# Patient Record
Sex: FEMALE | Race: WHITE | NOT HISPANIC OR LATINO
[De-identification: names, ages, dates, MRNs, and addresses within clinical notes are randomized per-mention and may not be internally consistent; named-entity substitution may affect disease eponyms.]

---

## 2021-03-19 PROBLEM — Z00.00 ENCOUNTER FOR PREVENTIVE HEALTH EXAMINATION: Status: ACTIVE | Noted: 2021-03-19

## 2021-03-22 ENCOUNTER — APPOINTMENT (OUTPATIENT)
Dept: OTOLARYNGOLOGY | Facility: CLINIC | Age: 21
End: 2021-03-22
Payer: COMMERCIAL

## 2021-03-22 VITALS
TEMPERATURE: 98.4 F | OXYGEN SATURATION: 98 % | DIASTOLIC BLOOD PRESSURE: 67 MMHG | BODY MASS INDEX: 26.66 KG/M2 | WEIGHT: 160 LBS | HEIGHT: 65 IN | HEART RATE: 66 BPM | SYSTOLIC BLOOD PRESSURE: 104 MMHG

## 2021-03-22 DIAGNOSIS — J32.2 CHRONIC ETHMOIDAL SINUSITIS: ICD-10-CM

## 2021-03-22 DIAGNOSIS — J34.89 OTHER SPECIFIED DISORDERS OF NOSE AND NASAL SINUSES: ICD-10-CM

## 2021-03-22 DIAGNOSIS — R06.89 OTHER ABNORMALITIES OF BREATHING: ICD-10-CM

## 2021-03-22 DIAGNOSIS — Z98.890 OTHER SPECIFIED POSTPROCEDURAL STATES: ICD-10-CM

## 2021-03-22 DIAGNOSIS — Z86.39 PERSONAL HISTORY OF OTHER ENDOCRINE, NUTRITIONAL AND METABOLIC DISEASE: ICD-10-CM

## 2021-03-22 DIAGNOSIS — Z78.9 OTHER SPECIFIED HEALTH STATUS: ICD-10-CM

## 2021-03-22 PROCEDURE — 99072 ADDL SUPL MATRL&STAF TM PHE: CPT

## 2021-03-22 PROCEDURE — 31231 NASAL ENDOSCOPY DX: CPT

## 2021-03-22 PROCEDURE — 99203 OFFICE O/P NEW LOW 30 MIN: CPT | Mod: 25

## 2021-03-22 PROCEDURE — 99204 OFFICE O/P NEW MOD 45 MIN: CPT | Mod: 25

## 2021-03-22 RX ORDER — METHIMAZOLE 5 MG/1
TABLET ORAL
Refills: 0 | Status: ACTIVE | COMMUNITY

## 2021-03-22 RX ORDER — MUPIROCIN 20 MG/G
2 OINTMENT TOPICAL TWICE DAILY
Qty: 2 | Refills: 3 | Status: ACTIVE | COMMUNITY
Start: 2021-03-22 | End: 1900-01-01

## 2021-03-22 RX ORDER — SULFAMETHOXAZOLE AND TRIMETHOPRIM 800; 160 MG/1; MG/1
800-160 TABLET ORAL TWICE DAILY
Qty: 20 | Refills: 0 | Status: ACTIVE | COMMUNITY
Start: 2021-03-22 | End: 1900-01-01

## 2021-03-23 NOTE — ASSESSMENT
[FreeTextEntry1] : 20F with nasal obstruction after rhinoplasty in June 2020. On exam, patient has significant nasal crusting and erythema. \par \par Plan:\par - Rx Bactrim- discussed risks of allergic reaction and sun sensitivity\par - Rx Bactroban\par - CT sinus, nose cultured\par - f/u labs anca, cbc, culture\par - f/u in 10 days

## 2021-03-23 NOTE — HISTORY OF PRESENT ILLNESS
[de-identified] : 20F w PMH of rhinoplasty in June 2020, who presents with nasal obstruction after rhinoplasty. She reports she has had nasal obstruction since the rhinoplasty in June done by a plastic surgeon in [place]. She also admits to intermittent epistaxis since the surgery. She denies any facial pain/pressure or anosmia. No other ENT complaints. No pertinent FH/Sh.She has seen a plastic surgeon who told her they could open her nose more but she needs to see an otolaryngologist to manage the irritation in her nose. She is a nonsmoker and uses no intranasal drugs. Here with her grandmother.

## 2021-03-23 NOTE — PROCEDURE
[Osteomeatal Pathology] : osteomeatal pathology [Posterior Lesion] : posterior lesion [Anterior rhinoscopy insufficient to account for symptoms] : anterior rhinoscopy insufficient to account for symptoms [Flexible Endoscope] : examined with the flexible endoscope [Serial Number: ___] : Serial Number: [unfilled] [Red] : red [Nasal Septum Mucosa Bleeding Right] : bleeding on the right [Normal] : the paranasal sinuses had no abnormalities [Moderate] : moderate [Cricket] : on both sides [FreeTextEntry6] : Procedure explained to patient with all risks, benefits and alternatives, all questions answered. Patient positioned sitting upright. Flexible endoscope was used to examine both nasal passages. Right nasal passage with normal inferior and middle turbinates. Left nasal passage with normal inferior and middle turbinates. Nasal septum straight without deviation or perforation. OMU with open outflow tract. No masses, lesions, polyps or purulent drainage. Eustachian opening without masses or swelling. Nasopharynx without obstruction or stenosis. Bilateral nasal crusting and erythema and edema. Evidence of excoriation and recent bleeding on r septum\par  [FreeTextEntry2] : bloody eschar on r [FreeTextEntry4] : infmallation b

## 2021-03-23 NOTE — PHYSICAL EXAM
[Nasal Endoscopy Performed] : nasal endoscopy was performed, see procedure section for findings [Midline] : trachea located in midline position [Normal] : no rashes [de-identified] : small nares and narrow bridge [de-identified] : bilateral nasal crusting and r > l induraiton [de-identified] : gait steady

## 2021-03-29 LAB
BACTERIA FLD CULT: ABNORMAL
BACTERIA FLD CULT: ABNORMAL

## 2021-04-02 ENCOUNTER — APPOINTMENT (OUTPATIENT)
Dept: OTOLARYNGOLOGY | Facility: CLINIC | Age: 21
End: 2021-04-02
Payer: COMMERCIAL

## 2021-04-02 VITALS
SYSTOLIC BLOOD PRESSURE: 121 MMHG | DIASTOLIC BLOOD PRESSURE: 83 MMHG | HEART RATE: 111 BPM | OXYGEN SATURATION: 98 % | TEMPERATURE: 98.2 F

## 2021-04-02 DIAGNOSIS — M54.2 CERVICALGIA: ICD-10-CM

## 2021-04-02 DIAGNOSIS — J34.89 OTHER SPECIFIED DISORDERS OF NOSE AND NASAL SINUSES: ICD-10-CM

## 2021-04-02 DIAGNOSIS — J00 ACUTE NASOPHARYNGITIS [COMMON COLD]: ICD-10-CM

## 2021-04-02 PROCEDURE — 99214 OFFICE O/P EST MOD 30 MIN: CPT

## 2021-04-02 PROCEDURE — 99072 ADDL SUPL MATRL&STAF TM PHE: CPT

## 2021-04-02 NOTE — PHYSICAL EXAM
[Normal] : no rashes [de-identified] : b [de-identified] : b crusts mild removed with bayonet forceps

## 2021-04-02 NOTE — ASSESSMENT
[FreeTextEntry1] : swelling decreased\par nose felt better after crusts removed\par continue bactroban 2 weeks\par reassured\par recommended to see Dr Serra if she wishes valves addressed or other areas of her nose\par rtc 1 mo to recheck if she doesn’t have the surgery and asked to call sooner for any problems\par tmj soft diet nsaids see dentist (staff gave her Dr Verde's contact info)\par she has concern about thyroid - I asked staff to make her appt with Dr Lennon as she has a h/o thyroid disorder

## 2021-04-02 NOTE — REASON FOR VISIT
[Subsequent Evaluation] : a subsequent evaluation for [FreeTextEntry2] : followup facial discomfort and nasal obstruction

## 2021-04-06 ENCOUNTER — APPOINTMENT (OUTPATIENT)
Dept: OTOLARYNGOLOGY | Facility: CLINIC | Age: 21
End: 2021-04-06
Payer: COMMERCIAL

## 2021-04-06 VITALS
DIASTOLIC BLOOD PRESSURE: 69 MMHG | OXYGEN SATURATION: 99 % | SYSTOLIC BLOOD PRESSURE: 104 MMHG | HEART RATE: 73 BPM | TEMPERATURE: 98.1 F

## 2021-04-06 DIAGNOSIS — J31.0 CHRONIC RHINITIS: ICD-10-CM

## 2021-04-06 DIAGNOSIS — E06.3 AUTOIMMUNE THYROIDITIS: ICD-10-CM

## 2021-04-06 DIAGNOSIS — E05.90 THYROTOXICOSIS, UNSPECIFIED W/OUT THYROTOXIC CRISIS OR STORM: ICD-10-CM

## 2021-04-06 DIAGNOSIS — E05.00 THYROTOXICOSIS WITH DIFFUSE GOITER W/OUT THYROTOXIC CRISIS OR STORM: ICD-10-CM

## 2021-04-06 PROCEDURE — 99215 OFFICE O/P EST HI 40 MIN: CPT | Mod: 25

## 2021-04-06 PROCEDURE — 99072 ADDL SUPL MATRL&STAF TM PHE: CPT

## 2021-04-06 PROCEDURE — 76536 US EXAM OF HEAD AND NECK: CPT

## 2021-04-06 PROCEDURE — 31575 DIAGNOSTIC LARYNGOSCOPY: CPT

## 2021-04-07 ENCOUNTER — APPOINTMENT (OUTPATIENT)
Dept: OTOLARYNGOLOGY | Facility: CLINIC | Age: 21
End: 2021-04-07
Payer: COMMERCIAL

## 2021-04-07 VITALS
OXYGEN SATURATION: 98 % | DIASTOLIC BLOOD PRESSURE: 73 MMHG | RESPIRATION RATE: 13 BRPM | HEART RATE: 91 BPM | SYSTOLIC BLOOD PRESSURE: 113 MMHG | TEMPERATURE: 98.1 F

## 2021-04-07 DIAGNOSIS — R51.9 HEADACHE, UNSPECIFIED: ICD-10-CM

## 2021-04-07 DIAGNOSIS — E05.00 THYROTOXICOSIS WITH DIFFUSE GOITER W/OUT THYROTOXIC CRISIS OR STORM: ICD-10-CM

## 2021-04-07 PROCEDURE — 99072 ADDL SUPL MATRL&STAF TM PHE: CPT

## 2021-04-07 PROCEDURE — 99214 OFFICE O/P EST MOD 30 MIN: CPT

## 2021-04-07 NOTE — PHYSICAL EXAM
[de-identified] : l>r spasm [de-identified] : mild crusting removed r>l with suction [Normal] : no rashes [de-identified] : gait steady

## 2021-04-07 NOTE — DATA REVIEWED
[de-identified] : culture results reviewed with pt [de-identified] : ct reviewed images and report with pt - some nasal valving, tmj, mild allergic change

## 2021-04-07 NOTE — HISTORY OF PRESENT ILLNESS
[de-identified] : followup 19 yo F who has rhinoplasty she estimates 6/2020 in [place], who says her surgeon retired and insists all records are unavailable. She has seen Dr Serra regarding rhinoplasty issues but also was sent to me by Dr Jackson to address any sinus issues as she c/o facial pain. She had ct which showed no sinusitis but had culture which grew MRSA and took bactrim and said she felt much better\par \par We received a call from Dr Jackson's office today saying she wanted to come over because she had pain again. I asked to find out if the pain was extreme and if so, to some over, but as I was already handling another emergency I asked that she come tomorrow id it was not severe. She came over and told our office and me she was "in agony". She is afebrile.She bursts into tears and points to her left temporomandibular joint and mandible when she describes the pain. She said she thinks a nerve was injured during her rhinoplasty because she sees a gap in her nasal dorsum and the pain travels from her temporomandibular joint to her forehead and around her eye.

## 2021-04-07 NOTE — ASSESSMENT
[FreeTextEntry1] : 1) tmj explained - as I recommended and explained at last visit of 4/2/2021 - soft diet, nsaids and I recommended she see Dr Verde as she did not contact Dr Verde or her own dentist when I recommended it at last visit. I offered his contact number again but she said she had it.\par \par 2) while nose does not appear infected I cultured it again\par \par 3) She was worried about a gap in her nasal dorsum. The appearance of her nasal dorsum is normal in our office. She was wondering about nerve injury from her rhinoplasty.  I explained that I do not practice facial plastic surgery at all and I reminded her that I recommended she contact Dr Serra about this type of issue as well as that of her nasal structure and appearance and valves as this is postop from a plastic surgery. I asked if she wished to contact Dr Serra as I had recommended - otherwise I explained I could give her the contact information for three other facial plastic surgeons in this immediate vicinity. She said she would call Dr Serra about this. I explained the nerves from her nose do not go into the joint of her jaw. She corrected me and told me she had read that they do. Again, I recommended she contact Dr Serra or I could recommend three other facial plastic surgeons. She said she would contact Dr Serra.\par \par 4) I explained that I was concerned because of her stating that she was in agony (she appeared quite comfortable) and the repeated tearful episodes and wondered if a consultation with a mental health professional could help. I offered her referral to a professional, i.e. psychiatrist, and offered to have her transported to ER if she felt she needed help more quickly than an elective appt. She refused both. I recommended she discuss this with Dr Jackson\par \par 5) Rtc 1 week to review culture results - will call her sooner with results if available sooner

## 2021-04-13 LAB
BACTERIA FLD CULT: ABNORMAL
BACTERIA FLD CULT: NORMAL

## 2021-04-14 ENCOUNTER — APPOINTMENT (OUTPATIENT)
Dept: OTOLARYNGOLOGY | Facility: CLINIC | Age: 21
End: 2021-04-14

## 2021-04-14 NOTE — PROCEDURE
[Image(s) Captured] : image(s) captured and filed [Unable to Cooperate with Mirror] : patient unable to cooperate with mirror [Gag Reflex] : gag reflex preventing mirror examination [None] : none [Topical Lidocaine] : topical lidocaine [Oxymetazoline HCl] : oxymetazoline HCl [Flexible Endoscope] : examined with the flexible endoscope [Serial Number: ___] : Serial Number: [unfilled] [FreeTextEntry1] : Physician performed high-resolution ultrasound gray scale imaging and color Doppler supplementation of the thyroid gland and neck was obtained in the longitudinal and transverse planes using a 13 MHz linear transducer with image capture.  All measurements are in centimeters (longitudinal x AP x transverse).   [FreeTextEntry2] : Graves' disease and a goiter. [FreeTextEntry3] : \par [facility]\par THYROID/NECK ULTRASOUND REPORT\par \par NAME: MICH GARZA .....           MR# [id number].....	              : 2000.....	         DATE: 2021\par \par HISTORY/ INDICATIONS: A 20-year-old female with Graves' disease for preoperative evaluation to rule out significant thyroid nodules.  \par \par COMPARISON: None\par \par PROCEDURE: Physician performed high-resolution ultrasound gray scale imaging and color Doppler supplementation of the thyroid gland and neck was obtained in the longitudinal and transverse planes using a 13 MHz linear transducer with image capture.  All measurements are in centimeters (longitudinal x AP x transverse).  \par \par FINDINGS: Overall the thyroid gland is enlarged, markedly heterogeneous in echotexture with increased vascularity on color Doppler flow.  There are bilateral  small nodules or pseudo nodules.\par \par RIGHT LOBE: Is enlarged, markedly heterogeneous, with increased vascularity on color Doppler and measures 4.76 x 2.35 x 2.84 cm.  NODULES: Within the right mid to upper lobe there is an ill-defined, hypoechoic, heterogeneous nodule without microcalcifications, grade 1 vascularity and wider than tall that measures 0.55 x 0.44 x 0.78 cm.\par \par ISTHMUS: Measures 0.97 cm in AP dimension and is heterogeneous in echotexture with normal vascularity.  No nodules are identified.\par \par LEFT LOBE: Is enlarged, markedly heterogeneous, with increased vascularity on color Doppler and measures 5.49 x 2.21 x 2.43 cm. NODULES: Within the left mid to lower lobe there is a mildly hypoechoic heterogeneous nodule with ill-defined margins, no microcalcifications, grade 1 vascularity that is wider than tall and measures 0.95 x 0.73 x 1.06 cm.\par \par PARATHYROID GLANDS: There are no identified enlarged parathyroid glands in the central neck compartment. \par \par LYMPH NODES: There are several benign appearing subcentimeter lymph nodes identified at neck levels II- III bilaterally (lateral neck), all with echogenic hilar lines, no calcifications or cystic degeneration and have a short long axis ratio < 0.5 in the transverse plane.  There are no enlarged or abnormal appearing central compartment, level VI lymph nodes.\par \par IMPRESSION: A 20-year-old female with an enlarged, markedly heterogeneous thyroid gland with bilateral tiny thyroid nodules or pseudo nodules multiple echogenic bands consistent with autoimmune thyroiditis/ Graves' disease.  \par \par RECOMMENDATIONS: If surgery is not planned, repeat thyroid ultrasound in 1 year as well as continued monitoring of thyroid function. \par \par Electronically signed by Lei Lennon MD on 2021, 10 AM.\par \par [facility]: [address], [address], [place] [address]\par [phone] (voice), [phone] (fax) [de-identified] : The nasal septum is minimally deviated to the right with crusting. There are no masses or polyps and the nasal mucosa and secretions are normal. The choanae and posterior nasopharynx are normal without masses or drainage. The Eustachian tube orifices appear patent. The pharynx, including the posterior and lateral pharyngeal walls, the vallecula and base of tongue are normal without ulcerations, lesions or masses. The hypopharynx including the pyriform sinuses open well without pooling of secretions, mucosal lesions or masses. The supraglottic larynx including the epiglottis, petiole, arytenoids, glossoepiglottic, aryepiglottic and pharyngoepiglottic folds are normal without mucosal lesions, ulcerations or masses. The glottis reveals normal false vocal folds. The true vocal folds are glistening white, tense and of equal length, without paralysis, having symmetric mobility on adduction and abduction. There are no mucosal lesions, nodules, cysts, erythroplasia or leukoplakia. The posterior cricoid area has healthy pink mucosa in the interarytenoid area and esophageal inlet. There is no thickening/edema of the interarytenoid mucosa suggestive of posterior laryngitis from laryngopharyngeal acid reflux disease. The trachea is clear without narrowing in the immediate subglottic region, without deviation or lesions.  [de-identified] : goiter/Graves' disease

## 2021-04-14 NOTE — HISTORY OF PRESENT ILLNESS
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  She was first treated with Methimazole ~ 5 years ago with heat intolerance, frequent BMs, SOB, palpitations, tachycardia and excessive sweating.  Last year she went into remission off Methimazole with diet changes for ~ 6 months and after a rhinoplasty then became hyperthyroid again and resumed Methimazole 5 mg in am, 2.5 mg HS. She is currently euthyroid with a persistent suppressed TSH of .006, T4 free .93 (3/14/2021).  She had a thyroid ultrasound ~ 2 years ago that demonstrated thyromegaly. She has no orbital involvement.  Last eye exam ~ 18 months ago. Laurie denies recent shortness of breath, voice changes, dysphagia, anterior neck pain or neck pressure. There is no family history of thyroid cancer. She denies any known radiation exposures in her youth. She stated that she is not interested in either VILLAFANA or surgery to manage her Graves' disease at this time and wants to pursue continued medical management as she did achieve remission at one point in time.  She was referred by Dr. Bounre after she was treated for a nasal cellulitis last month. She denies fever, body aches, cough, cyanosis, chest burning, anosmia or recent known COVID exposures.  All family members at home are well. She has not yet been vaccinated. \par

## 2021-04-14 NOTE — REASON FOR VISIT
[FreeTextEntry1] : Endocrinologist is Christ Darby MD, Brisa Guzman MD [FreeTextEntry2] : a surgical consultation for Graves' disease

## 2021-04-14 NOTE — CONSULT LETTER
[Dear  ___] : Dear  [unfilled], [Consult Letter:] : I had the pleasure of evaluating your patient, [unfilled]. [Please see my note below.] : Please see my note below. [Consult Closing:] : Thank you very much for allowing me to participate in the care of this patient.  If you have any questions, please do not hesitate to contact me. [Sincerely,] : Sincerely, [DrRosa  ___] : Dr. ARMSTRONG [FreeTextEntry1] :  Lei Lennon M.D., FACS, ECNU Director [facility] at [facility] Certified in Thyroid/Parathyroid/Neck Ultrasound, ELIZABETH/ TIFFANIE  , Department of Otolaryngology [facility] [employer] at [facility]  [FreeTextEntry3] : \par Lei Lennon M.D., FACS, ECNU\par Director [facility] Par[facility]roid [facility]\par [facility] at [facility]\par Certified in Thyroid/Parathyroid/Neck Ultrasound, ECNU/ AIUM\par \par , Department of Otolaryngology\par [facility] [employer] at [facility]\par

## 2021-05-07 ENCOUNTER — APPOINTMENT (OUTPATIENT)
Dept: OTOLARYNGOLOGY | Facility: CLINIC | Age: 21
End: 2021-05-07